# Patient Record
Sex: MALE | Race: OTHER | NOT HISPANIC OR LATINO | URBAN - METROPOLITAN AREA
[De-identification: names, ages, dates, MRNs, and addresses within clinical notes are randomized per-mention and may not be internally consistent; named-entity substitution may affect disease eponyms.]

---

## 2019-05-09 ENCOUNTER — EMERGENCY (EMERGENCY)
Facility: HOSPITAL | Age: 24
LOS: 0 days | Discharge: HOME | End: 2019-05-09
Admitting: EMERGENCY MEDICINE
Payer: MEDICAID

## 2019-05-09 VITALS
SYSTOLIC BLOOD PRESSURE: 128 MMHG | TEMPERATURE: 97 F | HEART RATE: 80 BPM | DIASTOLIC BLOOD PRESSURE: 60 MMHG | RESPIRATION RATE: 18 BRPM | OXYGEN SATURATION: 100 %

## 2019-05-09 VITALS — WEIGHT: 169.98 LBS | HEIGHT: 68 IN
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
## 2019-05-09 DIAGNOSIS — S20.212A CONTUSION OF LEFT FRONT WALL OF THORAX, INITIAL ENCOUNTER: ICD-10-CM

## 2019-05-09 DIAGNOSIS — M54.9 DORSALGIA, UNSPECIFIED: ICD-10-CM

## 2019-05-09 PROCEDURE — 99283 EMERGENCY DEPT VISIT LOW MDM: CPT

## 2019-05-09 PROCEDURE — 71101 X-RAY EXAM UNILAT RIBS/CHEST: CPT | Mod: 26,LT

## 2019-05-09 RX ORDER — IBUPROFEN 200 MG
800 TABLET ORAL ONCE
Refills: 0 | Status: COMPLETED | OUTPATIENT
Start: 2019-05-09 | End: 2019-05-09

## 2019-05-09 RX ADMIN — Medication 800 MILLIGRAM(S): at 15:52

## 2019-05-09 NOTE — ED PROVIDER NOTE - ENMT, MLM
Head NC/AT, neck supple, Airway patent, Nasal mucosa clear. Mouth with normal mucosa. Throat has no vesicles, no oropharyngeal exudates and uvula is midline.  TM's clear bilaterally.

## 2019-05-09 NOTE — ED ADULT TRIAGE NOTE - CHIEF COMPLAINT QUOTE
"I am having lower back pain for the past two days. I tripped and fell in the bathroom 2 days ago and landed on my back and its been bothering me since then"

## 2019-05-09 NOTE — ED PROVIDER NOTE - NSFOLLOWUPINSTRUCTIONS_ED_ALL_ED_FT
Rib Contusion  A rib contusion is a deep bruise on your rib area. Contusions are the result of a blunt trauma that causes bleeding and injury to the tissues under the skin. A rib contusion may involve bruising of the ribs and of the skin and muscles in the area. The skin over the contusion may turn blue, purple, or yellow. Minor injuries will give you a painless contusion. More severe contusions may stay painful and swollen for a few weeks.    What are the causes?  This condition is usually caused by a blow, trauma, or direct force to an area of the body. This often occurs while playing contact sports.    What are the signs or symptoms?  Symptoms of this condition include:  Swelling and redness of the injured area.  Discoloration of the injured area.  Tenderness and soreness of the injured area.  Pain with or without movement.  How is this diagnosed?  This condition may be diagnosed based on:  Your symptoms and medical history.  A physical exam.  Imaging tests—such as an X-ray, CT scan, or MRI—to determine if there were internal injuries or broken bones (fractures).  How is this treated?  This condition may be treated with:  Rest. This is often the best treatment for a rib contusion.  Icing. This reduces swelling and inflammation.  Deep-breathing exercises. These may be recommended to reduce the risk for lung collapse and pneumonia.  Medicines. Over-the-counter or prescription medicines may be given to control pain.  Injection of a numbing medicine around the nerve near your injury (nerve block).  Follow these instructions at home:  Image Image   Medicines     Take over-the-counter and prescription medicines only as told by your health care provider.  Do not drive or use heavy machinery while taking prescription pain medicine.  If you are taking prescription pain medicine, take actions to prevent or treat constipation. Your health care provider may recommend that you:   Drink enough fluid to keep your urine pale yellow.   Eat foods that are high in fiber, such as fresh fruits and vegetables, whole grains, and beans.   Limit foods that are high in fat and processed sugars, such as fried or sweet foods.   Take an over-the-counter or prescription medicine for constipation.  Managing pain, stiffness, and swelling     If directed, put ice on the injured area:  Put ice in a plastic bag.  Place a towel between your skin and the bag.  Leave the ice on for 20 minutes, 2–3 times a day.  Rest the injured area. Avoid strenuous activity and any activities or movements that cause pain. Be careful during activities and avoid bumping the injured area.  Do not lift anything that is heavier than 5 lb (2.3 kg), or the limit that you are told, until your health care provider says that it is safe.  General instructions     Do not use any products that contain nicotine or tobacco, such as cigarettes and e-cigarettes. These can delay healing. If you need help quitting, ask your health care provider.  Do deep-breathing exercises as told by your health care provider.  If you were given an incentive spirometer, use it every 1–2 hours while you are awake, or as recommended by your health care provider. This device measures how well you are filling your lungs with each breath.  Keep all follow-up visits as told by your health care provider. This is important.  Contact a health care provider if you have:  Increased bruising or swelling.  Pain that is not controlled with treatment.  A fever.  Get help right away if you:  Have difficulty breathing or shortness of breath.  Develop a continual cough or you cough up thick or bloody sputum.  Feel nauseous or you vomit.  Have pain in your abdomen.  Summary  A rib contusion is a deep bruise on your rib area. Contusions are the result of a blunt trauma that causes bleeding and injury to the tissues under the skin.  The skin overlying the contusion may turn blue, purple, or yellow. Minor injuries may give you a painless contusion. More severe contusions may stay painful and swollen for a few weeks.  Rest the injured area. Avoid strenuous activity and any activities or movements that cause pain.  This information is not intended to replace advice given to you by your health care provider. Make sure you discuss any questions you have with your health care provider.    Document Released: 09/12/2002 Document Revised: 01/16/2019 Document Reviewed: 01/16/2019  [employer] Interactive Patient Education © 2019 [employer]

## 2019-05-09 NOTE — ED PROVIDER NOTE - NEUROLOGICAL, MLM
Alert and oriented, no focal deficits, no motor or sensory deficits.  DTR's 2+ bilaterally.  Gait normal.

## 2019-05-09 NOTE — ED PROVIDER NOTE - NSFOLLOWUPCLINICS_GEN_ALL_ED_FT
[facility]  Medicine  [address], [place]   Phone: [phone]  Fax:   Follow Up Time:

## 2019-05-09 NOTE — ED PROVIDER NOTE - OBJECTIVE STATEMENT
24 y.o. male without any PMH presented to the ER c/o Left mid back pain s/p slip and fall in tub 3 days ago.  Denies head trauma, LOC, abdominal pain, flank pain, chest pain, extremity weakness/paresthesias, bladder/bowel incontinence, SOB. No other injuries or complaints.

## 2019-07-04 ENCOUNTER — EMERGENCY (EMERGENCY)
Facility: HOSPITAL | Age: 24
LOS: 0 days | Discharge: HOME | End: 2019-07-04
Attending: EMERGENCY MEDICINE | Admitting: EMERGENCY MEDICINE
Payer: MEDICAID

## 2019-07-04 VITALS
DIASTOLIC BLOOD PRESSURE: 81 MMHG | SYSTOLIC BLOOD PRESSURE: 137 MMHG | HEART RATE: 78 BPM | WEIGHT: 145.06 LBS | RESPIRATION RATE: 18 BRPM | OXYGEN SATURATION: 99 % | TEMPERATURE: 98 F

## 2019-07-04 DIAGNOSIS — K11.20 SIALOADENITIS, UNSPECIFIED: ICD-10-CM

## 2019-07-04 DIAGNOSIS — R59.0 LOCALIZED ENLARGED LYMPH NODES: ICD-10-CM

## 2019-07-04 DIAGNOSIS — R51 HEADACHE: ICD-10-CM

## 2019-07-04 PROBLEM — Z78.9 OTHER SPECIFIED HEALTH STATUS: Chronic | Status: ACTIVE | Noted: 2019-05-09

## 2019-07-04 LAB
ALBUMIN SERPL ELPH-MCNC: 4.3 G/DL — SIGNIFICANT CHANGE UP (ref 3.5–5.2)
ALP SERPL-CCNC: 60 U/L — SIGNIFICANT CHANGE UP (ref 30–115)
ALT FLD-CCNC: 18 U/L — SIGNIFICANT CHANGE UP (ref 0–41)
ANION GAP SERPL CALC-SCNC: 13 MMOL/L — SIGNIFICANT CHANGE UP (ref 7–14)
AST SERPL-CCNC: 23 U/L — SIGNIFICANT CHANGE UP (ref 0–41)
BASOPHILS # BLD AUTO: 0.03 K/UL — SIGNIFICANT CHANGE UP (ref 0–0.2)
BASOPHILS NFR BLD AUTO: 0.4 % — SIGNIFICANT CHANGE UP (ref 0–1)
BILIRUB SERPL-MCNC: 0.6 MG/DL — SIGNIFICANT CHANGE UP (ref 0.2–1.2)
BUN SERPL-MCNC: 11 MG/DL — SIGNIFICANT CHANGE UP (ref 10–20)
CALCIUM SERPL-MCNC: 9.3 MG/DL — SIGNIFICANT CHANGE UP (ref 8.5–10.1)
CHLORIDE SERPL-SCNC: 102 MMOL/L — SIGNIFICANT CHANGE UP (ref 98–110)
CO2 SERPL-SCNC: 24 MMOL/L — SIGNIFICANT CHANGE UP (ref 17–32)
CREAT SERPL-MCNC: 1.2 MG/DL — SIGNIFICANT CHANGE UP (ref 0.7–1.5)
EOSINOPHIL # BLD AUTO: 0.12 K/UL — SIGNIFICANT CHANGE UP (ref 0–0.7)
EOSINOPHIL NFR BLD AUTO: 1.5 % — SIGNIFICANT CHANGE UP (ref 0–8)
GLUCOSE SERPL-MCNC: 97 MG/DL — SIGNIFICANT CHANGE UP (ref 70–99)
HCT VFR BLD CALC: 46.4 % — SIGNIFICANT CHANGE UP (ref 42–52)
HGB BLD-MCNC: 15.2 G/DL — SIGNIFICANT CHANGE UP (ref 14–18)
HIV 1 & 2 AB SERPL IA.RAPID: SIGNIFICANT CHANGE UP
IMM GRANULOCYTES NFR BLD AUTO: 0.3 % — SIGNIFICANT CHANGE UP (ref 0.1–0.3)
LACTATE SERPL-SCNC: 1.2 MMOL/L — SIGNIFICANT CHANGE UP (ref 0.5–2.2)
LYMPHOCYTES # BLD AUTO: 1.61 K/UL — SIGNIFICANT CHANGE UP (ref 1.2–3.4)
LYMPHOCYTES # BLD AUTO: 20.1 % — LOW (ref 20.5–51.1)
MCHC RBC-ENTMCNC: 29.7 PG — SIGNIFICANT CHANGE UP (ref 27–31)
MCHC RBC-ENTMCNC: 32.8 G/DL — SIGNIFICANT CHANGE UP (ref 32–37)
MCV RBC AUTO: 90.6 FL — SIGNIFICANT CHANGE UP (ref 80–94)
MONOCYTES # BLD AUTO: 0.6 K/UL — SIGNIFICANT CHANGE UP (ref 0.1–0.6)
MONOCYTES NFR BLD AUTO: 7.5 % — SIGNIFICANT CHANGE UP (ref 1.7–9.3)
NEUTROPHILS # BLD AUTO: 5.62 K/UL — SIGNIFICANT CHANGE UP (ref 1.4–6.5)
NEUTROPHILS NFR BLD AUTO: 70.2 % — SIGNIFICANT CHANGE UP (ref 42.2–75.2)
NRBC # BLD: 0 /100 WBCS — SIGNIFICANT CHANGE UP (ref 0–0)
PLATELET # BLD AUTO: 291 K/UL — SIGNIFICANT CHANGE UP (ref 130–400)
POTASSIUM SERPL-MCNC: 4.3 MMOL/L — SIGNIFICANT CHANGE UP (ref 3.5–5)
POTASSIUM SERPL-SCNC: 4.3 MMOL/L — SIGNIFICANT CHANGE UP (ref 3.5–5)
PROT SERPL-MCNC: 7.3 G/DL — SIGNIFICANT CHANGE UP (ref 6–8)
RBC # BLD: 5.12 M/UL — SIGNIFICANT CHANGE UP (ref 4.7–6.1)
RBC # FLD: 12.3 % — SIGNIFICANT CHANGE UP (ref 11.5–14.5)
SODIUM SERPL-SCNC: 139 MMOL/L — SIGNIFICANT CHANGE UP (ref 135–146)
WBC # BLD: 8 K/UL — SIGNIFICANT CHANGE UP (ref 4.8–10.8)
WBC # FLD AUTO: 8 K/UL — SIGNIFICANT CHANGE UP (ref 4.8–10.8)

## 2019-07-04 PROCEDURE — 70491 CT SOFT TISSUE NECK W/DYE: CPT | Mod: 26

## 2019-07-04 PROCEDURE — 99284 EMERGENCY DEPT VISIT MOD MDM: CPT

## 2019-07-04 RX ORDER — CEPHALEXIN 500 MG
1 CAPSULE ORAL
Qty: 14 | Refills: 0
Start: 2019-07-04 | End: 2019-07-10

## 2019-07-04 RX ORDER — DEXAMETHASONE 0.5 MG/5ML
12 ELIXIR ORAL ONCE
Refills: 0 | Status: COMPLETED | OUTPATIENT
Start: 2019-07-04 | End: 2019-07-04

## 2019-07-04 RX ORDER — KETOROLAC TROMETHAMINE 30 MG/ML
30 SYRINGE (ML) INJECTION ONCE
Refills: 0 | Status: DISCONTINUED | OUTPATIENT
Start: 2019-07-04 | End: 2019-07-04

## 2019-07-04 RX ADMIN — Medication 30 MILLIGRAM(S): at 08:18

## 2019-07-04 RX ADMIN — Medication 106 MILLIGRAM(S): at 08:25

## 2019-07-04 NOTE — ED PROVIDER NOTE - CARE PROVIDER_API CALL
Louis Mora)  Otolaryngology  [address] [place] [address], [address] [place] [address]  Phone: [phone]  Fax: [phone]  Follow Up Time: 4-6 Days

## 2019-07-04 NOTE — ED PROVIDER NOTE - CLINICAL SUMMARY MEDICAL DECISION MAKING FREE TEXT BOX
25 yo man with right submandibular swelling with workup revealing a salivary stone and subsequent inflammation.  No fever, chills, abscess.  Antibiotics, NSAIDs, lozenges and close ENT follow up.

## 2019-07-04 NOTE — ED PROVIDER NOTE - ATTENDING CONTRIBUTION TO CARE
I personally evaluated the patient. I reviewed the Resident’s or Physician Assistant’s note (as assigned above), and agree with the findings and plan except as documented in my note.  23 yo man with right submandibular pain and swelling.  Workup revealed rigth sided partoid stone with obstrcution and some subsequent swelling.  Patient states that this has happened to him recurrently over the years.  Antibiotics, NSAID, encouraged lozenges.  ENT follow up.  Return for any new or worsening symptoms.

## 2019-07-04 NOTE — ED ADULT NURSE NOTE - OBJECTIVE STATEMENT
patient complaints of waking up with right side throat pain and swollen gland.  Patient denies fever. Patient reports difficulty swallowing.  Patient denies SOB and airway is patent.  Patient denies n/v.

## 2019-07-04 NOTE — ED PROVIDER NOTE - NS ED ROS FT
Review of Systems    Constitutional: (-) fever  Cardiovascular: (-) chest pain, (-) syncope  Respiratory: (-) cough, (-) shortness of breath  Gastrointestinal: (-) vomiting, (-) diarrhea, (-) abdominal pain  Musculoskeletal: (+) neck pain, (-) back pain, (-) joint pain  Integumentary: (-) rash, (+) edema  Neurological: (+) headache, (-) altered mental status    Except as documented in the HPI, all other systems are negative.

## 2019-07-04 NOTE — ED ADULT NURSE NOTE - CHPI ED NUR SYMPTOMS NEG
no bleeding gums/no loss of consciousness/no vomiting/no fever/no nausea/no syncope/no weakness/no chills/no numbness

## 2019-07-04 NOTE — ED PROVIDER NOTE - OBJECTIVE STATEMENT
24yM with no pmh, unvaccinated, recently moved from [facility] 10 weeks ago, p/w R anterior tender LAD with trismus starting this AM. pt unable to swallow without significant pain. severe constant aching pain worse with jaw opening, pulling on ear, pressing on neck, and swallowing, sudden onset on waking, a/w neck swelling. no fever chills n/v stridor sob dental pain or trauma.

## 2019-07-04 NOTE — ED PROVIDER NOTE - NSFOLLOWUPINSTRUCTIONS_ED_ALL_ED_FT
Patient to be discharged from ED. Any available test results were discussed with patient and/or family. Verbal instructions given, including instructions to return to ED immediately for any new, worsening, or concerning symptoms. Patient endorsed understanding. Written discharge instructions additionally given, including follow-up plan.    EnglishSpanish          Salivary Gland Infection  Image   A salivary gland infection is an infection in one or more of the glands that produce saliva. You have six major salivary glands. Each gland has a duct that carries saliva into your mouth. Saliva keeps your mouth moist and breaks down the food that you eat. It also helps prevent tooth decay.    Two salivary glands are located just in front of your ears (parotid). The ducts for these glands open up inside your cheeks, near your back teeth. You also have two glands under your tongue (sublingual) and two glands under your jaw (submandibular). The ducts for these glands open under your tongue. Any salivary gland can become infected. Most infections occur in the parotid glands or submandibular glands.    What are the causes?  This condition may be caused by bacteria or viruses.  The bacteria that cause salivary gland infections are usually the same bacteria that normally live in your mouth.  A stone can form in a salivary gland and block the flow of saliva. As a result, saliva backs up into the salivary gland. Bacteria may then start to grow behind the blockage and cause infection.  Bacterial infections usually cause pain and swelling on one side of the face. Submandibular gland swelling occurs under the jaw. Parotid swelling occurs in front of the ear.  Bacterial infections are more common in adults.  The mumps virus is the most common cause of viral salivary gland infections. However, mumps is now rare because of vaccination.  This infection causes swelling in both parotid glands.  Viral infections are more common in children.  What increases the risk?  The following factors may make you more likely to develop a bacterial infection:  Not taking good care of your mouth and teeth (poor oral hygiene).  Smoking.  Not drinking enough water.  Having a disease that causes dry mouth and dry eyes (Mikulicz syndrome or Sjogren syndrome).  A viral infection is more likely to occur in children who do not get the MMR (measles, mumps, rubella) vaccine.    What are the signs or symptoms?  The main sign of a salivary gland infection is a swollen salivary gland. This type of inflammation is often called sialadenitis. You may have swelling in front of your ear, under your jaw, or under your tongue. Swelling may get worse when you eat and decrease after you eat. Other signs and symptoms include:  Pain.  Tenderness.  Redness.  Dry mouth.  Bad taste in your mouth.  Difficulty chewing and swallowing.  Fever.  How is this diagnosed?  This condition may be diagnosed based on:  Your signs and symptoms.  A physical exam. During the exam, your health care provider will look and feel inside your mouth to see whether a stone is blocking a salivary gland duct.  Tests, such as:  An X-ray to check for a stone.  An ultrasound, CT scan, or MRI to look for an abscess and to rule out other causes of swelling.  A culture and sensitivity test. In this test, a sample of pus is taken from the salivary gland with a swab or by using a needle (aspiration). The sample is tested in a lab to determine the type of bacteria that is growing and which antibiotic medicines will work against it.  You may need to see an ear, nose, and throat specialist (ENT or otolaryngologist) for diagnosis and treatment.    How is this treated?  Viral salivary gland infections usually clear up without treatment. Bacterial infections are usually treated with antibiotic medicine. Severe infections that cause difficulty with swallowing may be treated with an IV antibiotic in the hospital.    Other treatments may include:  Probing and widening the salivary duct to allow a stone to pass. In some cases, a thin, flexible scope (endoscope) may be inserted into the duct to find a stone and remove it.  Breaking up a stone using sound waves.  Draining an infected gland (abscess) with a needle.  Surgery to:  Remove a stone.  Drain pus from an abscess.  Remove a badly infected gland.  Follow these instructions at home:  Image   Medicines     Take over-the-counter and prescription medicines only as told by your health care provider.  If you were prescribed an antibiotic medicine, take it as told by your health care provider. Do not stop taking the antibiotic even if you start to feel better.  To relieve discomfort     Follow these instructions every few hours:  Suck on a lemon candy to stimulate the flow of saliva.  Put a warm compress over the gland.  Gently massage the gland.  Rinse your mouth with a salt-water mixture 3–4 times a day or as needed. To make a salt-water mixture, completely dissolve ½–1 tsp of salt in 1 cup of warm water.  General instructions     Practice good oral hygiene by brushing and flossing your teeth after meals and before you go to bed.  Drink enough fluid to keep your urine pale yellow.  Do not use any products that contain nicotine or tobacco, such as cigarettes and e-cigarettes. If you need help quitting, ask your health care provider.  Keep all follow-up visits as told by your health care provider. This is important.  Contact a health care provider if:  You have pain and swelling in your face, jaw, or mouth after eating.  You have persistent swelling in any of these places:  In front of your ear.  Under your jaw.  Inside your mouth.  Get help right away if:  You have pain and swelling in your face, jaw, or mouth, and this is getting worse.  Your pain and swelling make it hard to swallow or breathe.  Summary  A salivary gland infection is an infection in one or more of the glands that produce saliva. You have six major salivary glands. Each gland has a duct that carries saliva into your mouth.  Any salivary gland can become infected. Most infections occur in the glands just in front of your ears (parotid glands) or the glands under your jaw (submandibular glands).  This condition may be caused by bacteria or viruses.  Salivary gland infections caused by a virus usually clear up without treatment. Bacterial infections are usually treated with antibiotic medicine.  This information is not intended to replace advice given to you by your health care provider. Make sure you discuss any questions you have with your health care provider.    Document Released: 01/25/2006 Document Revised: 01/16/2019 Document Reviewed: 01/16/2019  [employer] Interactive Patient Education © 2019 [employer]

## 2021-10-07 NOTE — ED ADULT NURSE NOTE - GASTROINTESTINAL WDL
Addendum  created 10/07/21 1456 by Carmelo Silvestre APRN CRNA    Flowsheet accepted, Intraprocedure Flowsheets edited      
Abdomen soft, nontender, nondistended, bowel sounds present in all 4 quadrants.